# Patient Record
Sex: FEMALE | Race: BLACK OR AFRICAN AMERICAN | NOT HISPANIC OR LATINO | URBAN - NONMETROPOLITAN AREA
[De-identification: names, ages, dates, MRNs, and addresses within clinical notes are randomized per-mention and may not be internally consistent; named-entity substitution may affect disease eponyms.]

---

## 2017-07-21 PROBLEM — H26.493: Noted: 2017-07-21

## 2017-07-21 PROBLEM — E11.9: Noted: 2017-07-21

## 2017-07-21 PROBLEM — H52.4: Noted: 2017-07-21

## 2017-07-21 PROBLEM — Z96.1: Noted: 2017-07-21

## 2019-04-22 ENCOUNTER — IMPORTED ENCOUNTER (OUTPATIENT)
Dept: URBAN - NONMETROPOLITAN AREA CLINIC 1 | Facility: CLINIC | Age: 64
End: 2019-04-22

## 2019-04-22 PROCEDURE — 92014 COMPRE OPH EXAM EST PT 1/>: CPT

## 2019-04-22 PROCEDURE — 92015 DETERMINE REFRACTIVE STATE: CPT

## 2021-04-30 ENCOUNTER — IMPORTED ENCOUNTER (OUTPATIENT)
Dept: URBAN - NONMETROPOLITAN AREA CLINIC 1 | Facility: CLINIC | Age: 66
End: 2021-04-30

## 2021-04-30 PROCEDURE — 92014 COMPRE OPH EXAM EST PT 1/>: CPT

## 2021-04-30 PROCEDURE — 92015 DETERMINE REFRACTIVE STATE: CPT

## 2021-04-30 NOTE — PATIENT DISCUSSION
Hyperopia / Astigmatism / Presbyopia OU- Discussed diagnosis in detail with patient- New glasses Rx given today- Continue to monitor- RTC 1 year complete NIDDM (1991)- Discussed diagnosis in detail with patient- Stressed importance of good blood sugar control- Recommend no soda’s- No BDR seen on today's exam- Patient reports last A1C is 6.3 or 6.5 - Letter to Estefanía- Continue to monitor- RTC 1 year completePseudophakia OU - Discussed diagnosis in detail with patient- Patient is stable and doing well with no glare complaint- Trace PCO noted OU but stable and no treatment needed ta this time - Continue to monitor

## 2022-01-04 NOTE — PATIENT DISCUSSION
1/4/22: SMALL AMT OF MUCOUS DISCHARGE. PT HAS BEEN HAVING PAIN FOR OVER 3 WKS, NO EVIDENCE OF INTRAOCULAR INFLAMMATION/INFECCION TODAY. RECOMMEND F/U WITHIN 2-3 D FOR F/U W HW. PT WENT ON A CRUISE W/O ABX COVERAGE. RESTARTED DROPS ON 12/28/21.

## 2022-01-18 NOTE — PATIENT DISCUSSION
No new tear/breaks/detachment seen TODAY. Thyroid  Prescription approved per [facility] Refill Protocol.    Dawn Bauer, RN, BSN

## 2022-03-15 NOTE — PATIENT DISCUSSION
STILL HAS DISCHARGE, HAS BEEN USING WATER TO [address] OUT. THE AREA OD REPAIR IS COVERED. TO USE ARTIFICIAL TEARS AND EYSUVIUS BID.

## 2022-04-09 ASSESSMENT — TONOMETRY
OS_IOP_MMHG: 15
OD_IOP_MMHG: 16
OD_IOP_MMHG: 14
OS_IOP_MMHG: 14

## 2022-04-09 ASSESSMENT — VISUAL ACUITY
OD_SC: 20/20
OS_SC: 20/20
OS_SC: 20/25-
OD_SC: 20/20

## 2022-05-17 NOTE — PATIENT DISCUSSION
5/17/22 OD STILL IRRITATED , COULD BE DUE TO FLOPPY EYLIDS , SLEEP ON RIGHT SIDE. START ERITHROMYCIN OINTMENT QHS.  TO DR Evelia Frank .

## 2022-06-01 ENCOUNTER — ESTABLISHED PATIENT (OUTPATIENT)
Dept: URBAN - NONMETROPOLITAN AREA CLINIC 1 | Facility: CLINIC | Age: 67
End: 2022-06-01

## 2022-06-01 DIAGNOSIS — H52.4: ICD-10-CM

## 2022-06-01 PROCEDURE — 92014 COMPRE OPH EXAM EST PT 1/>: CPT

## 2022-06-01 PROCEDURE — 92015 DETERMINE REFRACTIVE STATE: CPT

## 2022-06-01 ASSESSMENT — TONOMETRY
OS_IOP_MMHG: 14
OD_IOP_MMHG: 12

## 2022-06-01 ASSESSMENT — VISUAL ACUITY
OD_CC: 20/20-1
OS_CC: 20/20-1

## 2022-09-13 NOTE — PATIENT DISCUSSION
5/17/22 OD STILL IRRITATED , COULD BE DUE TO FLOPPY EYLIDS , SLEEP ON RIGHT SIDE. START ERITHROMYCIN OINTMENT QHS.  TO DR Ghulam Iglesias .

## 2022-09-13 NOTE — PATIENT DISCUSSION
9/13/22 [address]W [address] Melissa Garcia USED TOBRADEX FOR 1 WEEK, SLIGHT IMPROVEMENT, GETS MUCOUS DISCHARGE FROM THE EYE. SEEEMS CHRONIC SURFACE INFLAMMATION , START LOTEMAX USE BID, AND USE AT'S IN BETWEEN, CALL 3 WEEKS TO SEE IF IMPROVES OR NOT.

## 2022-09-13 NOTE — PATIENT DISCUSSION
ERM does NOT APPEAR VISUALLY SIGNIFICANT. Outreach attempt was made to schedule a Medicare Wellness Visit. This was the first attempt. Contact was made, [facility] appointment scheduled.11/19/21

## 2023-10-18 ENCOUNTER — ESTABLISHED PATIENT (OUTPATIENT)
Dept: URBAN - NONMETROPOLITAN AREA CLINIC 1 | Facility: CLINIC | Age: 68
End: 2023-10-18

## 2023-10-18 DIAGNOSIS — E11.9: ICD-10-CM

## 2023-10-18 DIAGNOSIS — H52.4: ICD-10-CM

## 2023-10-18 DIAGNOSIS — H26.493: ICD-10-CM

## 2023-10-18 DIAGNOSIS — Z96.1: ICD-10-CM

## 2023-10-18 PROCEDURE — 92015 DETERMINE REFRACTIVE STATE: CPT

## 2023-10-18 PROCEDURE — 92014 COMPRE OPH EXAM EST PT 1/>: CPT

## 2023-10-18 ASSESSMENT — TONOMETRY
OD_IOP_MMHG: 15
OS_IOP_MMHG: 15

## 2023-10-18 ASSESSMENT — VISUAL ACUITY
OU_CC: 20/20
OS_CC: 20/22-1
OD_CC: 20/22

## 2023-12-20 NOTE — PATIENT DISCUSSION
Discussed the importance of blood sugar control in the prevention of ocular complications. Notified Dr. Uhsa Leo of pts stroke-like s/s earlier today.

## 2024-07-26 ENCOUNTER — EMERGENCY VISIT (OUTPATIENT)
Dept: URBAN - NONMETROPOLITAN AREA CLINIC 1 | Facility: CLINIC | Age: 69
End: 2024-07-26

## 2024-07-26 DIAGNOSIS — H43.811: ICD-10-CM

## 2024-07-26 PROCEDURE — 99214 OFFICE O/P EST MOD 30 MIN: CPT

## 2024-07-26 PROCEDURE — 92250 FUNDUS PHOTOGRAPHY W/I&R: CPT

## 2024-07-26 ASSESSMENT — VISUAL ACUITY
OS_CC: 20/25
OD_CC: 20/30-2

## 2024-07-26 ASSESSMENT — TONOMETRY
OS_IOP_MMHG: 15
OD_IOP_MMHG: 15

## 2024-10-21 ENCOUNTER — COMPREHENSIVE EXAM (OUTPATIENT)
Dept: URBAN - NONMETROPOLITAN AREA CLINIC 1 | Facility: CLINIC | Age: 69
End: 2024-10-21

## 2024-10-21 DIAGNOSIS — H52.4: ICD-10-CM

## 2024-10-21 PROCEDURE — 92015 DETERMINE REFRACTIVE STATE: CPT

## 2024-10-21 PROCEDURE — 92014 COMPRE OPH EXAM EST PT 1/>: CPT
